# Patient Record
(demographics unavailable — no encounter records)

---

## 2025-06-25 NOTE — HISTORY OF PRESENT ILLNESS
[de-identified] : 6/25/25 The patient is a 12 year old male who presents today complaining of left thoracic curvature seen y pediatrician. Having pain in heels as well.  NO family history of scolisosi. no activity limitations.  No numbness weakness or tingling.     Date of Injury/Onset: 8 months  Pain:    At Rest: _/10 n/a With Activity:  _/10 n/a Mechanism of injury: NKI Quality of symptoms: no symptoms , incidental finding Improves with: n/a Worse with: n/a Prior treatment: Pediatrician Prior Imaging: no Additional Information: Patients mother states taking her son to pediatrician 10/2024 for check up and noticed a shift in his thoracic spine. She admits his heels are starting to have pain.

## 2025-06-25 NOTE — IMAGING
[de-identified] : Spine: Inspection/Palpation: No tenderness to palpation throughout Cervical/thoracic/lumbar spine. No bony stepoffs, No lesions. mil dhip asymmetry   Gait: Non-antalgic, able to perform bilateral toe and heel rise   Range of Motion: Lumbar Spine: Flexion to 90 degrees, Extension to 30 degrees, rotation 30 degrees bilaterally, lateral flexion to 30 degrees bilaterally.  Neurologic:  Bilateral Lower Extremities 5/5 Iliopsoas/Quadriceps/Hamstrings/ Tibialis Anterior/ Gastrocnemius. Extensor Hallucis Longus/ Flexor Hallucis Longus except    Sensation intact to light touch L2-S1  x-ray ap./lateral scoliosis.  8 degree coronal curve. no fractures

## 2025-06-25 NOTE — ASSESSMENT
[FreeTextEntry1] : 12 M with spinal  asymmetry Discussed role of growth and natural history in scoliosis FU 3 months repeat x-rays FU with foot/ankle

## 2025-06-25 NOTE — IMAGING
[de-identified] : Spine: Inspection/Palpation: No tenderness to palpation throughout Cervical/thoracic/lumbar spine. No bony stepoffs, No lesions. mil dhip asymmetry   Gait: Non-antalgic, able to perform bilateral toe and heel rise   Range of Motion: Lumbar Spine: Flexion to 90 degrees, Extension to 30 degrees, rotation 30 degrees bilaterally, lateral flexion to 30 degrees bilaterally.  Neurologic:  Bilateral Lower Extremities 5/5 Iliopsoas/Quadriceps/Hamstrings/ Tibialis Anterior/ Gastrocnemius. Extensor Hallucis Longus/ Flexor Hallucis Longus except    Sensation intact to light touch L2-S1  x-ray ap./lateral scoliosis.  8 degree coronal curve. no fractures

## 2025-06-25 NOTE — HISTORY OF PRESENT ILLNESS
[de-identified] : 6/25/25 The patient is a 12 year old male who presents today complaining of left thoracic curvature seen y pediatrician. Having pain in heels as well.  NO family history of scolisosi. no activity limitations.  No numbness weakness or tingling.     Date of Injury/Onset: 8 months  Pain:    At Rest: _/10 n/a With Activity:  _/10 n/a Mechanism of injury: NKI Quality of symptoms: no symptoms , incidental finding Improves with: n/a Worse with: n/a Prior treatment: Pediatrician Prior Imaging: no Additional Information: Patients mother states taking her son to pediatrician 10/2024 for check up and noticed a shift in his thoracic spine. She admits his heels are starting to have pain.

## 2025-07-02 NOTE — ASSESSMENT
[FreeTextEntry1] : 11 yo male presenting today with bilateral Sever's disease, right worse than the left. Patient's father present for today's exam. Recommend non-surgical management. -RLE WBAT in short cam boot, rx given today -Avoid strenuous/impact related activities -Rest, ice, compression, elevation, NSAIDs PRN for pain. -All questions answered -F/u 1 month with repeat x-rays   The diagnosis was explained in detail. The potential non-surgical and surgical treatments were reviewed. The relative risks and benefits of each option were considered relative to the patients age, activity level, medical history, symptom severity and previously attempted treatments.   The patient was advised to consult with their primary medical provider prior to initiation of any new medications to reduce the risk of adverse effects specific to their long-term home medications and medical history.   The patient's current medications management of their orthopedic diagnosis was reviewed today:   1) We discussed a comprehensive treatment plan that included possible pharmaceutical management involving the use of prescription strength medications including but not limited to options as oral Naprosyn 500mg BID, once daily Meloxicam 15 mg, or 500-650 mg Tylenol versus over-the-counter oral medications and topical prescriptions NSAID Pennsaid vs over the counter Voltaren gel.   2) There is a moderate risk of morbidity with further treatment, especially from use of prescription strength medications and possible side effects of these medications which include upset stomach with oral medications, skin reactions to topical medications and cardiac/renal issues with long term use.   3) I recommended that the patient follow-up with their medical physician to discuss any significant specific potential issues with long term medication use such as interactions with current medications or with exacerbation of underlying medical comorbidities.   4) The benefits and risks associated with use of injectable, oral or topical, prescription and over the counter anti-inflammatory medications were discussed with the patient. The patient voiced understanding of the risks including but not limited to bleeding, stroke, kidney dysfunction, heart disease, and were referred to the black box warning label for further information  Entered by Gabriel Morris PA-C acting as scribe. Dr. Bowers Attestation The documentation recorded by the scribe, in my presence, accurately reflects the service I, Dr. Bowers, personally performed, and the decisions made by me with my edits as appropriate.

## 2025-07-02 NOTE — HISTORY OF PRESENT ILLNESS
[Sudden] : sudden [8] : 8 [0] : 0 [Dull/Aching] : dull/aching [Throbbing] : throbbing [Intermittent] : intermittent [Household chores] : household chores [Leisure] : leisure [Social interactions] : social interactions [Rest] : rest [Student] : Work status: student [de-identified] : Patient here for bilat feet. Patient states NKI. Patient states pain started 9/2025. Patient states right foot is worse than left. Patient states he has aching pain in the bottom of his heels with weight bearing and impact related activities. Patient came into office ambulating on his own in sneakers with father.  [] : Post Surgical Visit: no [FreeTextEntry1] : Bilat feet  [FreeTextEntry3] : 9/2025 [FreeTextEntry5] : NKI  [de-identified] : Movement

## 2025-07-02 NOTE — PHYSICAL EXAM
[de-identified] : Examination of bilateral feet and ankles is as follows: INSPECTION: No swelling, no abrasion, no laceration, no erythema, no ecchymosis, no gross deformity, no ecchymosis of foot or ankle PALPATION: tenderness to palpation over calcaneal apophysis ROM: dorsiflexion 20 degrees, plantar flexion 40 degrees, inversion 30 degrees, eversion 20 degrees STRENGTH: dorsiflexion 5/5, plantarflexion 5/5, inversion 5/5, eversion 5/5, EHL 5/5, FHL 5/5 TESTING: negative Lopez test, pain with heel compression VASCULAR: dorsalis pedis pulse: 2+, posterior tibialis pulse: 2+ NEURO: Sensation present to light touch in all distributions GAIT: mildly antalgic, but patient ambulates without assistive device   X-rays of bilateral foot 3 views AP/Lateral/Oblique: open calcaneal apophyses. No fractures/dislocations/abnormalities.